# Patient Record
Sex: MALE | Race: OTHER | Employment: FULL TIME
[De-identification: names, ages, dates, MRNs, and addresses within clinical notes are randomized per-mention and may not be internally consistent; named-entity substitution may affect disease eponyms.]

---

## 2023-12-25 ENCOUNTER — HOSPITAL ENCOUNTER (EMERGENCY)
Facility: HOSPITAL | Age: 38
Discharge: HOME | End: 2023-12-25
Attending: EMERGENCY MEDICINE
Payer: COMMERCIAL

## 2023-12-25 VITALS
HEART RATE: 99 BPM | OXYGEN SATURATION: 97 % | WEIGHT: 242.73 LBS | BODY MASS INDEX: 34.75 KG/M2 | DIASTOLIC BLOOD PRESSURE: 92 MMHG | HEIGHT: 70 IN | SYSTOLIC BLOOD PRESSURE: 145 MMHG | TEMPERATURE: 98.9 F | RESPIRATION RATE: 18 BRPM

## 2023-12-25 DIAGNOSIS — J02.9 ACUTE PHARYNGITIS, UNSPECIFIED ETIOLOGY: Primary | ICD-10-CM

## 2023-12-25 DIAGNOSIS — E86.0 DEHYDRATION: ICD-10-CM

## 2023-12-25 LAB — DEPRECATED S PYO AG THROAT QL EIA: NEGATIVE

## 2023-12-25 PROCEDURE — 2580000003 HC RX 258: Performed by: EMERGENCY MEDICINE

## 2023-12-25 PROCEDURE — 87070 CULTURE OTHR SPECIMN AEROBIC: CPT

## 2023-12-25 PROCEDURE — 6360000002 HC RX W HCPCS: Performed by: EMERGENCY MEDICINE

## 2023-12-25 PROCEDURE — 87880 STREP A ASSAY W/OPTIC: CPT

## 2023-12-25 PROCEDURE — 96361 HYDRATE IV INFUSION ADD-ON: CPT

## 2023-12-25 PROCEDURE — 96374 THER/PROPH/DIAG INJ IV PUSH: CPT

## 2023-12-25 PROCEDURE — 99284 EMERGENCY DEPT VISIT MOD MDM: CPT

## 2023-12-25 RX ORDER — KETOROLAC TROMETHAMINE 30 MG/ML
15 INJECTION, SOLUTION INTRAMUSCULAR; INTRAVENOUS ONCE
Status: COMPLETED | OUTPATIENT
Start: 2023-12-25 | End: 2023-12-25

## 2023-12-25 RX ORDER — 0.9 % SODIUM CHLORIDE 0.9 %
1000 INTRAVENOUS SOLUTION INTRAVENOUS ONCE
Status: COMPLETED | OUTPATIENT
Start: 2023-12-25 | End: 2023-12-25

## 2023-12-25 RX ADMIN — SODIUM CHLORIDE 1000 ML: 9 INJECTION, SOLUTION INTRAVENOUS at 03:55

## 2023-12-25 RX ADMIN — KETOROLAC TROMETHAMINE 15 MG: 30 INJECTION, SOLUTION INTRAMUSCULAR at 03:56

## 2023-12-25 NOTE — ED PROVIDER NOTES
Complexity of Data Reviewed  Labs: ordered. Risk  Prescription drug management. Patient presents to the emergency department with pharyngitis and dehydration. Tachycardia has resolved with IV fluids and patient is resting comfortably with an improvement in his pain and symptoms after Toradol. Strep testing is negative and culture is pending. The patient is resting comfortably and feels better, is alert and in no distress. On re-examination the patient does not appear toxic and has no meningeal signs, and there is no intractable vomiting, no respiratory distress and no apparent pain. Based on the history, exam, diagnostic testing (if any) and reassessment, the patient has no signs of acute serious bacterial infections, or other significant pathology to warrant further testing, continued ED treatment, admission or specialist evaluation. The patient's vital signs have been stable. The patient's condition is stable and is appropriate for discharge. The patient or caregiver will pursue further outpatient evaluation with the primary care physician or other designated or consulting physician as indicated in the discharge instructions. REASSESSMENT            CONSULTS:  None    PROCEDURES:  Unless otherwise noted below, none     Procedures      FINAL IMPRESSION      1. Acute pharyngitis, unspecified etiology    2.  Dehydration          DISPOSITION/PLAN   DISPOSITION Decision To Discharge 12/25/2023 04:24:28 AM      PATIENT REFERRED TO:  [facility]  [id number] [id number] [phone]    As needed      DISCHARGE MEDICATIONS:  New Prescriptions    No medications on file         (Please note that portions of this note were completed with a voice recognition program.  Efforts were made to edit the dictations but occasionally words are mis-transcribed.)    Bobby Portillo MD (electronically signed)  Emergency Attending Physician / Physician Assistant / Nurse

## 2023-12-25 NOTE — ED TRIAGE NOTES
[facility] Nursing Note        Patient Name: Jose Carlos Sadler III      : 1985             MRN: [id number]      Chief Complaint:  Pharyngitis and Fever      Admit Diagnosis: No admission diagnoses are documented for this encounter. Admitting Provider: No admitting provider for patient encounter. Surgery: * No surgery found *           Patient arrived to the ER ambulatory from home with complaints of a Sore Throat & Subjective Fevers that started yesterday. Pt's Temp upon arrival was 100.4.          Lines:        Signed by: Hans Jimenez RN, GREGORY, BSN, [facility]                                              2023 at 3:40 AM

## 2023-12-27 LAB
BACTERIA SPEC CULT: NORMAL
SERVICE CMNT-IMP: NORMAL